# Patient Record
(demographics unavailable — no encounter records)

---

## 2024-10-11 NOTE — RESULTS/DATA
[TextEntry] : EKG NSR ? LAE  STRESS TEST  1. The ECG is negative for ischemia. Stress Test Results: Protocol: Standard Giovanni METS Achieved: 7.1 Stage Reached: 1 Exercise Duration: 4 min and 48 sec. Heart Rate: Resting 92 bpm, Stress peak 159 bpm (97 % max predicted) HR Response: Normal. Blood Pressure: Resting 125/86 mmHg, Stress max 144/91 mmHg BP Response: Normal. Exercise Capacity: Average. Pretest Chest Pain: None. Stress Test Chest Pain: no chest pain during exercise Symptoms During Stress: Fatigue. Reason for Termination: Fatigue and target heart rate achieved.  ECHO 1. Left ventricular cavity is normal in size. Left ventricular wall thickness is normal. Left ventricular systolic function is normal with an ejection fraction of 57 % by Crocker's method of disks. 2. Trace mitral regurgitation. 3. Mild aortic regurgitation. 4. Technically difficult image quality. 5. Trace pulmonic regurgitation. 6. Trace tricuspid regurgitation.

## 2024-10-11 NOTE — REASON FOR VISIT
[Hyperlipidemia] : hyperlipidemia [Hypertension] : hypertension [Other: ____] : [unfilled] [FreeTextEntry3] :  [FreeTextEntry1] : 55 y.o. female with a history of hypertension, hyperlipidemia and prediabetes. Was in the ER with renal colic, had a ureteral stent placed. Pre-op ureteral stone removal.

## 2024-10-11 NOTE — DISCUSSION/SUMMARY
[FreeTextEntry1] : THERE IS NO CARDIAC CONTRAINDICATION TO THE PLANNED PROCEDURE DIET DISCUSSED IN DETAIL PERIODIC ECHOES TO ASSESS LV FUNCTION AND DEGREE OF VALVULAR DISEASE CONTINUE METOPROLOLL SUCCINATE ER 50mg DAILY FOR HYPERTENSION FOLLOWUP WITH PMD FOLLOWUP IN 6 MONTHS WITH LABS [EKG obtained to assist in diagnosis and management of assessed problem(s)] : EKG obtained to assist in diagnosis and management of assessed problem(s)

## 2024-10-17 NOTE — ASSESSMENT
[FreeTextEntry1] :  56 year old female patient with large distal right stone. We will arrangement for ureteroscopy laser lithotripsy and stent replacement next week. She understands the benefits, risks, and expected outcomes. She wishes to proceed.  All of the patient's questions were otherwise answered. OR changed. Total time 40 min.   The submitted E/M billing level for this visit reflects the total time spent on the day of the visit including face-to-face time spent with the patient, non-face-to-face review of medical records and relevant information, documentation, and asynchronous communication with the patient after a visit via phone, email, or patients EHR portal after the visit. The medical records reviewed are either scanned into the chart or reviewed with the patient using a patients electronic medical records portal for patients with records not available to [facility] via electronic transmission platforms from other institutions and labs. Time spent counseling and performing coordination of care was also included in determining the appropriate EM billing level.   I have reviewed and verified information regarding the chief complaint and history recorded by the ancillary staff and/or the patient. I have independently reviewed and interpreted tests performed by other physicians and facilities as necessary.   I have discussed with the patient differential diagnosis, reason for auxiliary tests if ordered, risks, benefits, alternatives, and complications of each form of therapy were discussed.  I personally performed the services described in the documentation, reviewed the documentation recorded by the scribe in my presence, and it accurately and completely records my words and actions.

## 2024-10-17 NOTE — PHYSICAL EXAM
[General Appearance - Well Developed] : well developed [General Appearance - In No Acute Distress] : no acute distress [Oriented To Time, Place, And Person] : oriented to person, place, and time [FreeTextEntry1] :  Full physical was not done as recently done.

## 2024-10-17 NOTE — ASSESSMENT
[FreeTextEntry1] :  56 year old female patient with large distal right stone. We will arrangement for ureteroscopy laser lithotripsy and stent replacement next week. She understands the benefits, risks, and expected outcomes. She wishes to proceed.  All of the patient's questions were otherwise answered. OR changed. Total time 40 min.   The submitted E/M billing level for this visit reflects the total time spent on the day of the visit including face-to-face time spent with the patient, non-face-to-face review of medical records and relevant information, documentation, and asynchronous communication with the patient after a visit via phone, email, or patients EHR portal after the visit. The medical records reviewed are either scanned into the chart or reviewed with the patient using a patients electronic medical records portal for patients with records not available to [facility] via electronic transmission platforms from other institutions and labs. Time spent counseling and performing coordination of care was also included in determining the appropriate EM billing level.   I have reviewed and verified information regarding the chief complaint and history recorded by the ancillary staff and/or the patient. I have independently reviewed and interpreted tests performed by other physicians and facilities as necessary.   I have discussed with the patient differential diagnosis, reason for auxiliary tests if ordered, risks, benefits, alternatives, and complications of each form of therapy were discussed.  I personally performed the services described in the documentation, reviewed the documentation recorded by the scribe in my presence, and it accurately and completely records my words and actions.

## 2024-10-17 NOTE — HISTORY OF PRESENT ILLNESS
[FreeTextEntry1] :  56 year old female patient with right upper ureteral stone set up for shockwave lithotripsy. The stone dropped and pt had renal colic seen over the weekend and was stented. She is no longer have colic, although she is having some stent related discomfort. Denies flank pain except when she voids. Denies fever or chills. I have reviewed both her old and new CT from the weekend, which shows that the stone is now just below the pelvic brim in the distal ureter about 4 cm from the UVJ. It is a fairly large stone. I do not think it this location shockwave lithotripsy will be as good. Seeing that she already has the stent in place, we will let the ureter dilate a little and consider ureteroscopy and laser lithotripsy.

## 2024-10-17 NOTE — ADDENDUM
[FreeTextEntry1] :  JYOTI LOWERY, am scribing for and in the presence of  in the following sections: HISTORY OF PRESENT ILLNESS, PAST MEDICAL/FAMILY/SOCIAL HISTORY, REVIEW OF SYSTEMS, VITAL SIGNS, PHYSICAL EXAM, ASSESSMENT/PLAN on 10/08/2024.

## 2024-12-19 NOTE — ASSESSMENT
[FreeTextEntry1] :  56 year old female patient with history of stone disease. I have referred her to nephrology for stone workup . She will follow-up next summer with an ultrasound prior. We discussed stone prevention: I suggested keeping an urine output of 2L daily, keeping away from dark audra, and to add a tablespoon of lemon juice to a glass of water 2-3x a day for stone prevention.  All of the patient's questions were otherwise answered. Pt seen with KANA Rice.TT=30 min.   The submitted E/M billing level for this visit reflects the total time spent on the day of the visit including face-to-face time spent with the patient, non-face-to-face review of medical records and relevant information, documentation, and asynchronous communication with the patient after a visit via phone, email, or patients EHR portal after the visit. The medical records reviewed are either scanned into the chart or reviewed with the patient using a patients electronic medical records portal for patients with records not available to [facility] via electronic transmission platforms from other institutions and labs. Time spent counseling and performing coordination of care was also included in determining the appropriate EM billing level.   I have reviewed and verified information regarding the chief complaint and history recorded by the ancillary staff and/or the patient. I have independently reviewed and interpreted tests performed by other physicians and facilities as necessary.   I have discussed with the patient differential diagnosis, reason for auxiliary tests if ordered, risks, benefits, alternatives, and complications of each form of therapy were discussed.  I personally performed the services described in the documentation, reviewed the documentation recorded by the scribe in my presence, and it accurately and completely records my words and actions.

## 2024-12-19 NOTE — ASSESSMENT
[FreeTextEntry1] :  56 year old female patient with history of stone disease. I have referred her to nephrology for stone workup . She will follow-up next summer with an ultrasound prior. We discussed stone prevention: I suggested keeping an urine output of 2L daily, keeping away from dark audra, and to add a tablespoon of lemon juice to a glass of water 2-3x a day for stone prevention.  All of the patient's questions were otherwise answered. Pt seen with KANA Rice.TT=30 min.   The submitted E/M billing level for this visit reflects the total time spent on the day of the visit including face-to-face time spent with the patient, non-face-to-face review of medical records and relevant information, documentation, and asynchronous communication with the patient after a visit via phone, email, or patients EHR portal after the visit. The medical records reviewed are either scanned into the chart or reviewed with the patient using a patients electronic medical records portal for patients with records not available to [facility] via electronic transmission platforms from other institutions and labs. Time spent counseling and performing coordination of care was also included in determining the appropriate EM billing level.   I have reviewed and verified information regarding the chief complaint and history recorded by the ancillary staff and/or the patient. I have independently reviewed and interpreted tests performed by other physicians and facilities as necessary.   I have discussed with the patient differential diagnosis, reason for auxiliary tests if ordered, risks, benefits, alternatives, and complications of each form of therapy were discussed.  I personally performed the services described in the documentation, reviewed the documentation recorded by the scribe in my presence, and it accurately and completely records my words and actions.

## 2024-12-19 NOTE — HISTORY OF PRESENT ILLNESS
[FreeTextEntry1] :  56 year old female patient who is doing well with history of stone disease s/p stent removal. She is doing well with no pain, discomfort or hematuria. Her ultrasound was clear.   Renal and Bladder Ultrasound done on 11/14/24:  Normal renal and urinary bladder ultrasound. In specific, hydronephrosis seen on recent CT scan is no longer identified.

## 2024-12-19 NOTE — ADDENDUM
[FreeTextEntry1] :  JYOTI LOWERY, am scribing for and in the presence of  in the following sections: HISTORY OF PRESENT ILLNESS, PAST MEDICAL/FAMILY/SOCIAL HISTORY, REVIEW OF SYSTEMS, VITAL SIGNS, PHYSICAL EXAM, ASSESSMENT/PLAN on 12/13/2024.